# Patient Record
Sex: MALE | Race: OTHER | Employment: FULL TIME | URBAN - METROPOLITAN AREA
[De-identification: names, ages, dates, MRNs, and addresses within clinical notes are randomized per-mention and may not be internally consistent; named-entity substitution may affect disease eponyms.]

---

## 2020-09-02 PROBLEM — R45.4 IRRITABILITY: Status: ACTIVE | Noted: 2020-03-13

## 2020-09-02 PROBLEM — L21.9 SEBORRHEIC DERMATITIS: Status: ACTIVE | Noted: 2020-01-27

## 2020-09-02 PROBLEM — E55.9 VITAMIN D DEFICIENCY: Status: ACTIVE | Noted: 2018-05-28

## 2020-09-02 PROBLEM — Z63.5 MARRIAGE SEPARATION: Status: ACTIVE | Noted: 2020-03-13

## 2020-09-02 PROBLEM — F90.0 ATTENTION DEFICIT HYPERACTIVITY DISORDER (ADHD), PREDOMINANTLY INATTENTIVE TYPE: Status: ACTIVE | Noted: 2020-01-27

## 2020-09-02 PROBLEM — R19.7 DIARRHEA: Status: ACTIVE | Noted: 2019-12-02

## 2020-09-02 PROBLEM — Z63.0 PARTNER RELATIONAL PROBLEM: Status: ACTIVE | Noted: 2020-03-13

## 2020-09-02 PROBLEM — H91.93 DECREASED HEARING OF BOTH EARS: Status: ACTIVE | Noted: 2020-02-05

## 2020-09-02 PROBLEM — D35.2 PITUITARY MACROADENOMA (HCC): Status: ACTIVE | Noted: 2020-05-21

## 2020-11-05 ENCOUNTER — HOSPITAL ENCOUNTER (OUTPATIENT)
Dept: PHYSICAL THERAPY | Age: 55
Discharge: HOME | End: 2020-11-05
Payer: COMMERCIAL

## 2020-11-05 PROCEDURE — 97110 THERAPEUTIC EXERCISES: CPT

## 2020-11-05 PROCEDURE — 97535 SELF CARE MNGMENT TRAINING: CPT

## 2020-11-05 PROCEDURE — 97161 PT EVAL LOW COMPLEX 20 MIN: CPT

## 2020-11-05 NOTE — PROGRESS NOTES
PHYSICAL THERAPY - DAILY TREATMENT NOTE    Patient Name: Yajaira Luo        Date: 2020  : 1965   YES Patient  Verified  Visit #:   1   of   1  Insurance: Payor: Angelica Wong / Plan: [address] PT / Product Type: Commerical /      In time: 1:51 Out time: 2:28   Total Treatment Time: 37     Medicare/[facility] Time Tracking (below)   Total Timed Codes (min):  NA 1:1 Treatment Time:  NA     TREATMENT AREA =  LBP    SUBJECTIVE    Pain Level (on 0 to 10 scale):  2  / 10   Medication Changes/New allergies or changes in medical history, any new surgeries or procedures? NO    If yes, update Summary List   Subjective Functional Status/Changes:  []  No changes reported     See POC          OBJECTIVE    10 min Therapeutic Exercise:  [x]  See flow sheet   Rationale:      increase ROM and reduce pain to improve the patients ability to bend, ADL     10 min Self Care: Reviewed diagnosis, prognosis, therapy progression   Rationale:    Improve understanding of injury and therapy to have realistic expectation of therapy to improve compliance/adherence and satisfaction    Billed With/As:   [x] TE   [] TA   [] Neuro   [x] Self Care Patient Education: [x] Review HEP    [] Progressed/Changed HEP based on:   [] positioning   [] body mechanics   [] transfers   [] heat/ice application    [] other:        Other Objective/Functional Measures:    Shown and performed HEP     Post Treatment Pain Level (on 0 to 10) scale:   2 / 10     ASSESSMENT  Assessment/Changes in Function:     See POC     []  See Progress Note/Recertification   Patient will continue to benefit from skilled PT services to modify and progress therapeutic interventions to attain goals per POC. Progress toward goals / Updated goals:    See POC     PLAN  []  Upgrade activities as tolerated ?  Continue plan of care   []  Discharge due to : Likely DC as no need for skilled services at this time, may f/u if symptoms increase   []  Other:      Therapist: Lucretia Cabrera Melba PT, DPT, OCS, CSCS    Date: 11/5/2020 Time: 1:50 PM

## 2020-11-05 NOTE — PROGRESS NOTES
[address], [facility] [address], [address] [facility] - Phone: [phone]  Fax: [phone] / [address] [facility]  Patient Name: Dipika Hart : 1965   Medical   Diagnosis: LBP Treatment Diagnosis: Low back pain [M54.5]     Onset Date: 10/24/20     Referral Source: Oli Ramirez MD [facility]): 2020   Prior Hospitalization: See medical history Provider #: [id number]   Prior Level of Function: Previous no difficulty with bending to lift or moderate actvity   Comorbidities: Arthritis   Medications: Verified on Patient Summary List   The Plan of Care and following information is based on the information from the initial evaluation.   ===========================================================================================  Assessment / key information:  Dipika Hart is a 50 y.o.  yo male with Dx: LBP, who reports sleeping in a different bed night prior and waking on 10/24/20 with some low back stiffness, he then waxed his car from a seated position and had high pain next day, going to ER with xrays showing moderate arthritis L4-S1 per his reports. Pt rates pain as 10/10 max, 2/10 at best, 2/10 today located at central and right LS, denies pain down LEs. PMHx: sig for similar but lesser pain in 2018 and had injections with relief. Current treatment includes flexerial and oxy with some relief. Objective: FOTO score = 55 (an established functional score where 100 = no disability). Pt with no difficulty transfers in clinic, sit to stand, sit to supine, supine to prone. LE strength normal for knee ext, flex, hip flex, ankle DF, hip abd, hip ER hip ext bilat. Min pain with hip ext on right.   Special tests: + Slump right for LBP, negative SLR bilat, KITA bilt, min pain on right LS with supine bridge, repeated flexion in standing with min increase in pain, improved slightly with prone position. Min tender to palpation at right LS paraspinal L4-5, and central L4-5, elsewhere no pain. Given HEP, discussed self care and activity modification. Pt inquired about return to work which we stated would need to come from the physician. Pt instructed in HEP Likely DC at this point as symptoms are low and improving and manageable with basic HEP.  ===========================================================================================  Eval Complexity: History MEDIUM  Complexity : 1-2 comorbidities / personal factors will impact the outcome/ POC ;  Examination  HIGH Complexity : 4+ Standardized tests and measures addressing body structure, function, activity limitation and / or participation in recreation ; Presentation LOW Complexity : Stable, uncomplicated ;  Decision Making MEDIUM Complexity : FOTO score of 26-74; Overall Complexity LOW   Problem List: pain affecting function, decrease ROM, decrease ADL/ functional abilitiies and decrease flexibility/ joint mobility FOTO = 55  Treatment Plan may include any combination of the following: Therapeutic exercise, Therapeutic activities, Neuromuscular re-education, Physical agent/modality, Manual therapy, Patient education and Self Care training  Patient / Family readiness to learn indicated by: asking questions, trying to perform skills and interest  Persons(s) to be included in education: patient (P)  Barriers to Learning/Limitations: no  Measures taken, if barriers to learning: N/A   Patient Goal (s): \"pain relief\"   Patient self reported health status: good  Rehabilitation Potential: good   Short Term Goals: To be accomplished in  1-2  Visits:  1. Independent with HEP. 2. Decrease max pain 25-50% to assist with ADLs, bending.     Frequency / Duration:   Patient to be seen  2-3  times per week for 4-6  weeks:  Patient / Caregiver education and instruction: self care and exercises  Therapist Signature: Richardson Person PT, DPT, OCS, [facility] Date: 72/4/8762   Certification Period: NA Time: 1:50 PM   ===========================================================================================  I certify that the above Physical Therapy Services are being furnished while the patient is under my care. I agree with the treatment plan and certify that this therapy is necessary. Physician Signature:        Date:       Time:     Please sign and return to [facility] [place] or you may fax the signed copy to [phone]. Thank you.

## 2021-03-01 NOTE — PROGRESS NOTES
[address] PHYSICAL THERAPY  [address] [place] [address] [facility] Doan [facility] [address],[place], [address] [facility] - Phone: [phone]  Fax: [phone] NOTE  Patient Name: Danica Spence : 1965   Treatment/Medical Diagnosis: Low back pain [M54.5]   Referral Source: Joanne Pinto MD     Date of Initial Visit: 20 Attended Visits: 1 Missed Visits: 0       SUMMARY OF TREATMENT  Pt attended only initial evaluation, was given HEP. Told to return if symptoms did not continue to improve. RECOMMENDATIONS  Discontinue physical therapy due to patient not returning. If you have any questions/comments please contact us directly at [phone]. Thank you for allowing us to assist in the care of your patient.     Therapist Signature: Marialuisa Resendiz PT, DPT, OCS, CSCS Date: 20     Time: 3:21 PM